# Patient Record
Sex: FEMALE | Race: WHITE | NOT HISPANIC OR LATINO | Employment: FULL TIME | URBAN - NONMETROPOLITAN AREA
[De-identification: names, ages, dates, MRNs, and addresses within clinical notes are randomized per-mention and may not be internally consistent; named-entity substitution may affect disease eponyms.]

---

## 2017-07-12 RX ORDER — NORGESTIMATE AND ETHINYL ESTRADIOL 0.25-0.035
1 KIT ORAL DAILY
Qty: 28 TABLET | Refills: 0 | Status: SHIPPED | OUTPATIENT
Start: 2017-07-12 | End: 2017-07-25 | Stop reason: SDUPTHER

## 2017-07-25 ENCOUNTER — OFFICE VISIT (OUTPATIENT)
Dept: OBSTETRICS AND GYNECOLOGY | Facility: CLINIC | Age: 22
End: 2017-07-25

## 2017-07-25 VITALS
RESPIRATION RATE: 16 BRPM | HEIGHT: 64 IN | BODY MASS INDEX: 18.95 KG/M2 | HEART RATE: 78 BPM | SYSTOLIC BLOOD PRESSURE: 100 MMHG | DIASTOLIC BLOOD PRESSURE: 60 MMHG | WEIGHT: 111 LBS

## 2017-07-25 DIAGNOSIS — Z01.419 ENCOUNTER FOR GYNECOLOGICAL EXAMINATION WITH PAPANICOLAOU SMEAR OF CERVIX: Primary | ICD-10-CM

## 2017-07-25 DIAGNOSIS — Z30.41 ORAL CONTRACEPTIVE PILL SURVEILLANCE: ICD-10-CM

## 2017-07-25 PROCEDURE — 88142 CYTOPATH C/V THIN LAYER: CPT | Performed by: PATHOLOGY

## 2017-07-25 PROCEDURE — 99395 PREV VISIT EST AGE 18-39: CPT | Performed by: NURSE PRACTITIONER

## 2017-07-25 RX ORDER — NORGESTIMATE AND ETHINYL ESTRADIOL 0.25-0.035
1 KIT ORAL DAILY
Qty: 28 TABLET | Refills: 12 | Status: SHIPPED | OUTPATIENT
Start: 2017-07-25

## 2017-07-25 NOTE — PROGRESS NOTES
Subjective   Chief Complaint   Patient presents with   • Gynecologic Exam     well woman annual visit   • Contraception     needs ocp refills     Yin Restrepo is a 21 y.o. year old  presenting to be seen for her annual exam.  Today she has no complaints. 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     Patient's last menstrual period was 2017 (approximate).    OB History      Para Term  AB TAB SAB Ectopic Multiple Living    0 0 0 0 0 0 0 0 0 0        Current birth control method: OCP (estrogen/progesterone).    She is sexually active.  In the past 12 months there has not been new sexual partners.  Condoms are not typically used.  She would not like to be screened for STD's at today's exam.     Past 6 month menstrual history:    Cycle Frequency: regular, predictable and consistent every 28 - 32 days   Menstrual cycle character: flow is typically light   Cycle Duration: 4 - 5   Number of heavy days of flows: 0   Dysmenorrhea: none and is not affecting her activities of daily living   PMS: is not affecting her activities of daily living   Intermenstrual bleeding present: no   Post-coital bleeding present: no     She exercises regularly: yes.  She wears her seat belt:yes.  She has concerns about domestic violence: no.  Last colonoscopy: Never  Last DEXA: Never  Last MMG: Never  Last pap: 16  History of abnormal PAP: No    The following portions of the patient's history were reviewed and updated as appropriate:problem list, current medications, allergies, past family history, past medical history, past social history and past surgical history.    Smoking status: Never Smoker                                                              Smokeless status: Never Used                        History   Alcohol Use No      Review of Systems   Constitutional: Negative.    Respiratory: Negative.    Cardiovascular: Negative.    Gastrointestinal: Negative.  Negative for constipation and diarrhea.   Endocrine:  "Negative.    Genitourinary: Negative.  Negative for menstrual problem and vaginal discharge.   Skin: Negative.    Neurological: Negative for dizziness, syncope, light-headedness and headaches.   Psychiatric/Behavioral: Negative for suicidal ideas. The patient is not nervous/anxious.          Objective   /60 (BP Location: Right arm, Patient Position: Sitting, Cuff Size: Adult)  Pulse 78  Resp 16  Ht 64\" (162.6 cm)  Wt 111 lb (50.3 kg)  LMP 07/11/2017 (Approximate)  Breastfeeding? No  BMI 19.05 kg/m2    General:  well developed; well nourished  no acute distress   Skin:  No suspicious lesions seen   Thyroid: not examined   Breasts:  Examined in supine position  Symmetric without masses or skin dimpling  Nipples normal without inversion, lesions or discharge  There are no palpable axillary nodes   Abdomen: soft, non-tender; no masses  no umbilical or inginual hernias are present  no hepato-splenomegaly  Normal findings: bowel sounds normal   Cardiac: Heart sounds are normal.  Regular rate and rhythm without murmur, gallop or rub.   Resp: Normal expansion.  Clear to auscultation.  No rales, rhonchi, or wheezing.   Psych: alert,oriented, in NAD with a full range of affect, normal behavior and no psychotic features   Pelvis: Uterus:  Clinical staff was present for exam  External genitalia:  normal appearance of the external genitalia including Bartholin's and [place]'s glands.  :  urethral meatus normal; urethral hypermobility is absent.  Vaginal:  normal pink mucosa without prolapse or lesions  Cervix:  normal appearance.  Uterus:  normal size, shape and consistency  Adnexa:  normal bimanual exam of the adnexa.     Lab Review   No data reviewed    Imaging  No data reviewed       Assessment   1. Encounter for GYN exam with pap smear of cervix  2. Oral contraceptive pill surveillance     Plan   1. Pap results: I will send card in mail or call if abnormal. RTC annually for well woman exams  2. Continue OCP as " prescribed. RTC in 1 year.     New Medications Ordered This Visit   Medications   • norgestimate-ethinyl estradiol (ORTHO-CYCLEN) 0.25-35 MG-MCG per tablet     Sig: Take 1 tablet by mouth Daily.     Dispense:  28 tablet     Refill:  12          This note was electronically signed.    Sharonda Conde, APRN  July 25, 2017

## 2017-08-01 LAB
LAB AP CASE REPORT: NORMAL
LAB AP GYN ADDITIONAL INFORMATION: NORMAL
Lab: NORMAL
PATH INTERP SPEC-IMP: NORMAL
STAT OF ADQ CVX/VAG CYTO-IMP: NORMAL